# Patient Record
Sex: MALE | Race: WHITE | NOT HISPANIC OR LATINO | Employment: OTHER | URBAN - METROPOLITAN AREA
[De-identification: names, ages, dates, MRNs, and addresses within clinical notes are randomized per-mention and may not be internally consistent; named-entity substitution may affect disease eponyms.]

---

## 2017-03-17 ENCOUNTER — OFFICE VISIT (OUTPATIENT)
Dept: URGENT CARE | Facility: CLINIC | Age: 62
End: 2017-03-17
Payer: MEDICARE

## 2017-03-17 PROCEDURE — 99213 OFFICE O/P EST LOW 20 MIN: CPT

## 2017-03-17 PROCEDURE — G0463 HOSPITAL OUTPT CLINIC VISIT: HCPCS

## 2018-01-18 NOTE — PROGRESS NOTES
Assessment    1  Pharyngitis (462) (J02 9)    Plan  Sore throat    · (1) THROAT CULTURE (CULTURE, UPPER RESPIRATORY); Status:Active -  Retrospective By Protocol Authorization; Requested MONIQUE:[id number];     Discussion/Summary  Discussion Summary:   Your rapid strep was negative  A throat culture has been sent  CAll in 3 days for the results  IF they are positive, you will need a prescription  YOu are to do warm salt water gargles  Tylenol for pain  Robitussin cough or Mucinex  Drink water  Medication Side Effects Reviewed: Possible side effects of new medications were reviewed with the patient/guardian today  Understands and agrees with treatment plan: The treatment plan was reviewed with the patient/guardian  The patient/guardian understands and agrees with the treatment plan   Follow Up Instructions: Follow Up with your Primary Care Provider in 2 days  If your symptoms worsen, go to the nearest [facility] Emergency Department  Chief Complaint    1  Cold Symptoms  Chief Complaint Free Text Note Form: Pt  relates has been sick for 3 weeks with productive cough + yellow sputum  Denies fever  C/O sore throat and concerned he might have strep throat  Pt  was given an antibiotic at [address] [facility] finished x 1 1/2 week ago  History of Present Illness  HPI: This is a 63year old male with a PMH of COPD and smoking who quit smoking 12 days ago who states that he has been ill with cough and cold symptoms x 3 weeks  He states that he was seen at a [address] and was given an antibiotic which he completed 1 1/2 weeks ago  He states he is feeling better but has a sorethroat and it isn't getting better  He is concerned he may have strep  He states he is coughing up some yellow green phlem  Denies SOB, fevers  Pt was on Augmentin x 7 days  He states that mucinex doesn't' really help much     Hospital Based Practices Required Assessment:   Abuse And Domestic Violence Screen    Yes, the patient is safe at home  The patient states no one is hurting them  Depression And Suicide Screen  No, the patient has not had thoughts of hurting themself  No, the patient has not felt depressed in the past 7 days  Prefered Language is  english  Primary Language is  english  Review of Systems  Focused-Male:   Constitutional: as noted in HPI  Active Problems    1  Cold (460) (J00)    Past Medical History    1  History of chronic obstructive lung disease (V12 69) (Z87 09)  Active Problems And Past Medical History Reviewed: The active problems and past medical history were reviewed and updated today  Family History    1  Family history of cerebrovascular accident (CVA) (V17 1) (Z82 3)    2  Family history of Type 2 diabetes mellitus without complication  Family History Reviewed: The family history was reviewed and updated today  Social History    · Former smoker (P24 20) (L81 341)   · No drug use   · Social alcohol use (F10 99)  Social History Reviewed: The social history was reviewed and updated today  The social history was reviewed and is unchanged  Surgical History    1  History of Inguinal Hernia Repair  Surgical History Reviewed: The surgical history was reviewed and updated today  Current Meds   1  Combivent  MCG/ACT AERO; Therapy: (Recorded:30Jan2016) to Recorded  Medication List Reviewed: The medication list was reviewed and updated today  Allergies    1  No Known Drug Allergies    Vitals  Signs [Data Includes: Current Encounter]   Recorded: 10AUG1800 11:40AM   Temperature: 97 5 F, Tympanic  Heart Rate: 81  Respiration: 18  Systolic: 664, RUE, Sitting  Diastolic: 82, RUE, Sitting  Height: 6 ft 4 in  Weight: 249 lb 8 oz  BMI Calculated: 30 37  BSA Calculated: 2 43  O2 Saturation: 97, RA  Pain Scale: 3    Physical Exam    Constitutional   General appearance: No acute distress, well appearing and well nourished      Eyes   Conjunctiva and lids: No swelling, erythema, or discharge  Pupils and irises: Equal, round and reactive to light  Ears, Nose, Mouth, and Throat   External inspection of ears and nose: Normal     Otoscopic examination: Tympanic membrance translucent with normal light reflex  Canals patent without erythema  Nasal mucosa, septum, and turbinates: Normal without edema or erythema  Oropharynx: Normal with no erythema, edema, exudate or lesions  Pulmonary   Respiratory effort: No increased work of breathing or signs of respiratory distress  Auscultation of lungs: Abnormal   Very decreased through out  NO W/R/R  Cardiovascular   Auscultation of heart: Normal rate and rhythm, normal S1 and S2, without murmurs  Lymphatic   Palpation of lymph nodes in neck: No lymphadenopathy      Psychiatric   Orientation to person, place and time: Normal     Mood and affect: Normal        Results/Data  Lab Studies Reviewed: Rapid strep negative  Throat culture sent      Signatures   Electronically signed by : Reece Media, [facility]; Jan 30 2016 12:04PM EST                       (Author)    Electronically signed by : EVERETTE Clark ; Feb 1 2016  9:10PM EST                       (Co-author)

## 2018-01-30 ENCOUNTER — TRANSCRIBE ORDERS (OUTPATIENT)
Dept: ADMINISTRATIVE | Facility: HOSPITAL | Age: 63
End: 2018-01-30

## 2018-01-30 DIAGNOSIS — R91.1 NODULE OF LEFT LUNG: ICD-10-CM

## 2018-01-30 DIAGNOSIS — R91.8 LUNG NODULES: Primary | ICD-10-CM

## 2018-02-19 ENCOUNTER — HOSPITAL ENCOUNTER (OUTPATIENT)
Dept: CT IMAGING | Facility: CLINIC | Age: 63
Discharge: HOME | End: 2018-02-19
Payer: MEDICARE

## 2018-02-19 DIAGNOSIS — R91.8 LUNG NODULES: ICD-10-CM

## 2018-02-19 PROCEDURE — 71250 CT THORAX DX C-: CPT

## 2018-09-06 ENCOUNTER — TRANSCRIBE ORDERS (OUTPATIENT)
Dept: ADMINISTRATIVE | Facility: HOSPITAL | Age: 63
End: 2018-09-06

## 2018-09-06 DIAGNOSIS — R91.1 LUNG NODULE: Primary | ICD-10-CM

## 2018-09-13 ENCOUNTER — HOSPITAL ENCOUNTER (OUTPATIENT)
Dept: CT IMAGING | Facility: CLINIC | Age: 63
Discharge: HOME | End: 2018-09-13
Payer: MEDICARE

## 2018-09-13 DIAGNOSIS — R91.1 LUNG NODULE: ICD-10-CM

## 2018-09-13 PROCEDURE — 71250 CT THORAX DX C-: CPT

## 2018-11-21 ENCOUNTER — TRANSCRIBE ORDERS (OUTPATIENT)
Dept: ADMINISTRATIVE | Facility: HOSPITAL | Age: 63
End: 2018-11-21

## 2018-11-21 DIAGNOSIS — I73.9 PERIPHERAL VASCULAR DISEASE, UNSPECIFIED (HCC): Primary | ICD-10-CM

## 2018-11-29 ENCOUNTER — HOSPITAL ENCOUNTER (OUTPATIENT)
Dept: NON INVASIVE DIAGNOSTICS | Facility: HOSPITAL | Age: 63
Discharge: HOME | End: 2018-11-29
Payer: MEDICARE

## 2018-11-29 DIAGNOSIS — I73.9 PERIPHERAL VASCULAR DISEASE, UNSPECIFIED (HCC): ICD-10-CM

## 2018-11-29 PROCEDURE — 93925 LOWER EXTREMITY STUDY: CPT | Performed by: SURGERY

## 2018-11-29 PROCEDURE — 93923 UPR/LXTR ART STDY 3+ LVLS: CPT

## 2018-11-29 PROCEDURE — 93922 UPR/L XTREMITY ART 2 LEVELS: CPT | Performed by: SURGERY

## 2018-11-29 PROCEDURE — 93925 LOWER EXTREMITY STUDY: CPT

## 2018-12-19 ENCOUNTER — TRANSCRIBE ORDERS (OUTPATIENT)
Dept: ADMINISTRATIVE | Facility: HOSPITAL | Age: 63
End: 2018-12-19

## 2018-12-19 DIAGNOSIS — C62.90 MALIGNANT NEOPLASM OF TESTIS, UNSPECIFIED LATERALITY, UNSPECIFIED WHETHER DESCENDED OR UNDESCENDED: Primary | ICD-10-CM

## 2018-12-19 DIAGNOSIS — K64.4 SKIN TAGS, ANUS OR RECTUM: ICD-10-CM

## 2018-12-19 DIAGNOSIS — C34.90 MALIGNANT NEOPLASM OF LUNG, UNSPECIFIED LATERALITY, UNSPECIFIED PART OF LUNG (HCC): ICD-10-CM

## 2018-12-19 DIAGNOSIS — Z85.038 HISTORY OF COLON CANCER: ICD-10-CM

## 2019-01-15 ENCOUNTER — HOSPITAL ENCOUNTER (OUTPATIENT)
Dept: CT IMAGING | Facility: HOSPITAL | Age: 64
Discharge: HOME | End: 2019-01-15
Payer: MEDICARE

## 2019-01-15 DIAGNOSIS — Z85.038 HISTORY OF COLON CANCER: ICD-10-CM

## 2019-01-15 DIAGNOSIS — K64.4 SKIN TAGS, ANUS OR RECTUM: ICD-10-CM

## 2019-01-15 DIAGNOSIS — C34.90 MALIGNANT NEOPLASM OF LUNG, UNSPECIFIED LATERALITY, UNSPECIFIED PART OF LUNG (HCC): ICD-10-CM

## 2019-01-15 DIAGNOSIS — C62.90 MALIGNANT NEOPLASM OF TESTIS, UNSPECIFIED LATERALITY, UNSPECIFIED WHETHER DESCENDED OR UNDESCENDED: ICD-10-CM

## 2019-01-15 PROCEDURE — 74261 CT COLONOGRAPHY DX: CPT
